# Patient Record
Sex: FEMALE | Race: BLACK OR AFRICAN AMERICAN | NOT HISPANIC OR LATINO | URBAN - METROPOLITAN AREA
[De-identification: names, ages, dates, MRNs, and addresses within clinical notes are randomized per-mention and may not be internally consistent; named-entity substitution may affect disease eponyms.]

---

## 2022-04-19 ENCOUNTER — EMERGENCY (EMERGENCY)
Facility: HOSPITAL | Age: 67
LOS: 1 days | Discharge: HOME | End: 2022-04-19
Admitting: EMERGENCY MEDICINE
Payer: MEDICAID

## 2022-04-19 VITALS
SYSTOLIC BLOOD PRESSURE: 143 MMHG | DIASTOLIC BLOOD PRESSURE: 71 MMHG | TEMPERATURE: 98 F | RESPIRATION RATE: 16 BRPM | HEART RATE: 78 BPM | OXYGEN SATURATION: 100 %

## 2022-04-19 PROCEDURE — 99282 EMERGENCY DEPT VISIT SF MDM: CPT

## 2022-04-19 NOTE — ED PROVIDER NOTE - PATIENT PORTAL LINK FT
You can access the FollowMyHealth Patient Portal offered by [facility] by registering at the following website: [email or web address]. By joining [employer]’s FollowMyHealth portal, you will also be able to view your health information using other applications (apps) compatible with our system.

## 2022-04-19 NOTE — ED ADULT TRIAGE NOTE - CHIEF COMPLAINT QUOTE
Pt c/o left side bottom mouth pain since January that's getting worse.  S/P extraction in October.  Left jaw inflammation.

## 2022-04-19 NOTE — ED PROVIDER NOTE - OBJECTIVE STATEMENT
66F with no PMH presenting with L upper jaw pain x 4 months. States she had L upper second molar extraction in October in a free clinic in [place] without complication. Pain began in January described throbbing ache in area of extraction. Has not gone back to the dentist to be evaluated or to primary care doctor who referred her. Has not taken anything for pain. Unable to eat on L side due to pain. Endorses L cheek pain. Denies seeing any drainage or blood. Denies fever, chills, CP, SOB, abd pain, N/V.

## 2022-04-19 NOTE — ED PROVIDER NOTE - CLINICAL SUMMARY MEDICAL DECISION MAKING FREE TEXT BOX
66 y.o female c/o L upper tooth pain x3 months, no swelling, no difficulty swallowing. no fever- rec to use nsaids and f/u in dental clinc.

## 2022-04-19 NOTE — ED PROVIDER NOTE - NSFOLLOWUPCLINICS_GEN_ALL_ED_FT
Oral & Maxillofacial Surgery  Department of Dental Medicine  [phone] [place] [address]  Phone: [phone]  Fax: [phone]

## 2022-04-19 NOTE — ED ADULT TRIAGE NOTE - DATE OF LAST VACCINATION
"LOC: The patient is awake, alert and aware of environment with an appropriate affect, the patient is oriented x 3 and speaking appropriately. Patient denies any changes in sensation. Patient with equal hand grasps bilaterally. Symmetrical facial expression present.   APPEARANCE: Patient resting comfortably and in no acute distress, patient is clean and well groomed, patient's clothing is properly fastened.  SKIN: The skin is warm and dry, patient has normal skin turgor and moist mucus membranes, skin intact, no breakdown or brusing noted.  MUSCULOSKELETAL: Patient moving all extremities well, no obvious swelling or deformities noted.  RESPIRATORY: Airway is open and patent, respirations are spontaneous, patient has a normal effort and rate. Breath sounds are clear and equal bilaterally. Ren cough.   CARDIAC: Normal heart sounds. No peripheral edema. Patient denies chest pain or SOA.  ABDOMEN: Soft and non tender to palpation, no distention noted. Bowel sounds present. Patient reports difficulty having BM. Patient denies N/V/D. Patient reports "achy" abdominal pain that feels like "gas".   " 19-Apr-2022

## 2022-11-07 NOTE — ED PROVIDER NOTE - CROS ED ROS STATEMENT
Addended by: Radha Spencer on: 11/7/2022 04:53 PM     Modules accepted: Level of Service
all other ROS negative except as per HPI

## 2025-04-28 ENCOUNTER — INPATIENT (INPATIENT)
Facility: HOSPITAL | Age: 70
LOS: 2 days | Discharge: HOME | End: 2025-05-01
Attending: HOSPITALIST | Admitting: HOSPITALIST
Payer: MEDICARE

## 2025-04-28 VITALS
TEMPERATURE: 97 F | RESPIRATION RATE: 20 BRPM | SYSTOLIC BLOOD PRESSURE: 122 MMHG | HEART RATE: 66 BPM | DIASTOLIC BLOOD PRESSURE: 74 MMHG | WEIGHT: 134.92 LBS | HEIGHT: 66 IN | OXYGEN SATURATION: 100 %

## 2025-04-28 DIAGNOSIS — R26.2 DIFFICULTY IN WALKING, NOT ELSEWHERE CLASSIFIED: ICD-10-CM

## 2025-04-28 DIAGNOSIS — R55 SYNCOPE AND COLLAPSE: ICD-10-CM

## 2025-04-28 DIAGNOSIS — M25.551 PAIN IN RIGHT HIP: ICD-10-CM

## 2025-04-28 PROBLEM — Z78.9 OTHER SPECIFIED HEALTH STATUS: Chronic | Status: ACTIVE | Noted: 2022-04-19

## 2025-04-28 LAB
ALBUMIN SERPL ELPH-MCNC: 3 G/DL — LOW (ref 3.3–5)
ALP SERPL-CCNC: 113 U/L — SIGNIFICANT CHANGE UP (ref 40–120)
ALT FLD-CCNC: 20 U/L — SIGNIFICANT CHANGE UP (ref 12–78)
ANION GAP SERPL CALC-SCNC: 2 MMOL/L — LOW (ref 5–17)
AST SERPL-CCNC: 17 U/L — SIGNIFICANT CHANGE UP (ref 15–37)
BASOPHILS # BLD AUTO: 0.03 K/UL — SIGNIFICANT CHANGE UP (ref 0–0.2)
BASOPHILS NFR BLD AUTO: 0.4 % — SIGNIFICANT CHANGE UP (ref 0–2)
BILIRUB SERPL-MCNC: 0.5 MG/DL — SIGNIFICANT CHANGE UP (ref 0.2–1.2)
BUN SERPL-MCNC: 17 MG/DL — SIGNIFICANT CHANGE UP (ref 7–23)
CALCIUM SERPL-MCNC: 8.9 MG/DL — SIGNIFICANT CHANGE UP (ref 8.5–10.1)
CHLORIDE SERPL-SCNC: 105 MMOL/L — SIGNIFICANT CHANGE UP (ref 96–108)
CO2 SERPL-SCNC: 30 MMOL/L — SIGNIFICANT CHANGE UP (ref 22–31)
CREAT SERPL-MCNC: 0.89 MG/DL — SIGNIFICANT CHANGE UP (ref 0.5–1.3)
CRP SERPL-MCNC: 25 MG/L — HIGH
EGFR: 70 ML/MIN/1.73M2 — SIGNIFICANT CHANGE UP
EGFR: 70 ML/MIN/1.73M2 — SIGNIFICANT CHANGE UP
EOSINOPHIL # BLD AUTO: 0.08 K/UL — SIGNIFICANT CHANGE UP (ref 0–0.5)
EOSINOPHIL NFR BLD AUTO: 1.1 % — SIGNIFICANT CHANGE UP (ref 0–6)
ERYTHROCYTE [SEDIMENTATION RATE] IN BLOOD: 23 MM/HR — HIGH (ref 0–20)
GLUCOSE SERPL-MCNC: 75 MG/DL — SIGNIFICANT CHANGE UP (ref 70–99)
HCT VFR BLD CALC: 35.3 % — SIGNIFICANT CHANGE UP (ref 34.5–45)
HGB BLD-MCNC: 11.2 G/DL — LOW (ref 11.5–15.5)
IMM GRANULOCYTES NFR BLD AUTO: 0.1 % — SIGNIFICANT CHANGE UP (ref 0–0.9)
LYMPHOCYTES # BLD AUTO: 1.39 K/UL — SIGNIFICANT CHANGE UP (ref 1–3.3)
LYMPHOCYTES # BLD AUTO: 19.5 % — SIGNIFICANT CHANGE UP (ref 13–44)
MAGNESIUM SERPL-MCNC: 2.2 MG/DL — SIGNIFICANT CHANGE UP (ref 1.6–2.6)
MCHC RBC-ENTMCNC: 27.8 PG — SIGNIFICANT CHANGE UP (ref 27–34)
MCHC RBC-ENTMCNC: 31.7 G/DL — LOW (ref 32–36)
MCV RBC AUTO: 87.6 FL — SIGNIFICANT CHANGE UP (ref 80–100)
MONOCYTES # BLD AUTO: 0.78 K/UL — SIGNIFICANT CHANGE UP (ref 0–0.9)
MONOCYTES NFR BLD AUTO: 10.9 % — SIGNIFICANT CHANGE UP (ref 2–14)
NEUTROPHILS # BLD AUTO: 4.84 K/UL — SIGNIFICANT CHANGE UP (ref 1.8–7.4)
NEUTROPHILS NFR BLD AUTO: 68 % — SIGNIFICANT CHANGE UP (ref 43–77)
NRBC BLD AUTO-RTO: 0 /100 WBCS — SIGNIFICANT CHANGE UP (ref 0–0)
PLATELET # BLD AUTO: 214 K/UL — SIGNIFICANT CHANGE UP (ref 150–400)
POTASSIUM SERPL-MCNC: 4.2 MMOL/L — SIGNIFICANT CHANGE UP (ref 3.5–5.3)
POTASSIUM SERPL-SCNC: 4.2 MMOL/L — SIGNIFICANT CHANGE UP (ref 3.5–5.3)
PROT SERPL-MCNC: 6.7 GM/DL — SIGNIFICANT CHANGE UP (ref 6–8.3)
RBC # BLD: 4.03 M/UL — SIGNIFICANT CHANGE UP (ref 3.8–5.2)
RBC # FLD: 14 % — SIGNIFICANT CHANGE UP (ref 10.3–14.5)
SODIUM SERPL-SCNC: 137 MMOL/L — SIGNIFICANT CHANGE UP (ref 135–145)
TROPONIN I, HIGH SENSITIVITY RESULT: <3 NG/L — SIGNIFICANT CHANGE UP
TSH SERPL-MCNC: 1.09 UU/ML — SIGNIFICANT CHANGE UP (ref 0.36–3.74)
WBC # BLD: 7.13 K/UL — SIGNIFICANT CHANGE UP (ref 3.8–10.5)
WBC # FLD AUTO: 7.13 K/UL — SIGNIFICANT CHANGE UP (ref 3.8–10.5)

## 2025-04-28 PROCEDURE — 71045 X-RAY EXAM CHEST 1 VIEW: CPT | Mod: 26

## 2025-04-28 PROCEDURE — 99285 EMERGENCY DEPT VISIT HI MDM: CPT

## 2025-04-28 PROCEDURE — 70450 CT HEAD/BRAIN W/O DYE: CPT | Mod: 26

## 2025-04-28 PROCEDURE — 93010 ELECTROCARDIOGRAM REPORT: CPT

## 2025-04-28 PROCEDURE — 99222 1ST HOSP IP/OBS MODERATE 55: CPT

## 2025-04-28 PROCEDURE — 73502 X-RAY EXAM HIP UNI 2-3 VIEWS: CPT | Mod: 26,RT

## 2025-04-28 RX ORDER — TIMOLOL MALEATE 6.8 MG/ML
1 SOLUTION OPHTHALMIC
Refills: 0 | Status: DISCONTINUED | OUTPATIENT
Start: 2025-04-28 | End: 2025-05-01

## 2025-04-28 RX ORDER — BRIMONIDINE TARTRATE, TIMOLOL MALEATE 2; 5 MG/ML; MG/ML
1 SOLUTION/ DROPS TOPICAL
Refills: 0 | DISCHARGE

## 2025-04-28 RX ORDER — MELATONIN 5 MG
3 TABLET ORAL AT BEDTIME
Refills: 0 | Status: DISCONTINUED | OUTPATIENT
Start: 2025-04-28 | End: 2025-05-01

## 2025-04-28 RX ORDER — INFLUENZA A VIRUS A/IDAHO/07/2018 (H1N1) ANTIGEN (MDCK CELL DERIVED, PROPIOLACTONE INACTIVATED, INFLUENZA A VIRUS A/INDIANA/08/2018 (H3N2) ANTIGEN (MDCK CELL DERIVED, PROPIOLACTONE INACTIVATED), INFLUENZA B VIRUS B/SINGAPORE/INFTT-16-0610/2016 ANTIGEN (MDCK CELL DERIVED, PROPIOLACTONE INACTIVATED), INFLUENZA B VIRUS B/IOWA/06/2017 ANTIGEN (MDCK CELL DERIVED, PROPIOLACTONE INACTIVATED) 15; 15; 15; 15 UG/.5ML; UG/.5ML; UG/.5ML; UG/.5ML
0.5 INJECTION, SUSPENSION INTRAMUSCULAR ONCE
Refills: 0 | Status: DISCONTINUED | OUTPATIENT
Start: 2025-04-28 | End: 2025-05-01

## 2025-04-28 RX ORDER — LATANOPROST PF 0.05 MG/ML
1 SOLUTION/ DROPS OPHTHALMIC
Refills: 0 | DISCHARGE

## 2025-04-28 RX ORDER — LATANOPROST PF 0.05 MG/ML
1 SOLUTION/ DROPS OPHTHALMIC AT BEDTIME
Refills: 0 | Status: DISCONTINUED | OUTPATIENT
Start: 2025-04-28 | End: 2025-05-01

## 2025-04-28 RX ORDER — ACETAMINOPHEN 500 MG/5ML
1000 LIQUID (ML) ORAL ONCE
Refills: 0 | Status: COMPLETED | OUTPATIENT
Start: 2025-04-28 | End: 2025-04-28

## 2025-04-28 RX ORDER — ACETAMINOPHEN 500 MG/5ML
650 LIQUID (ML) ORAL EVERY 6 HOURS
Refills: 0 | Status: DISCONTINUED | OUTPATIENT
Start: 2025-04-28 | End: 2025-05-01

## 2025-04-28 RX ORDER — BRIMONIDINE TARTRATE 1.5 MG/ML
1 SOLUTION/ DROPS OPHTHALMIC
Refills: 0 | Status: DISCONTINUED | OUTPATIENT
Start: 2025-04-28 | End: 2025-05-01

## 2025-04-28 RX ORDER — SODIUM CHLORIDE 9 G/1000ML
1000 INJECTION, SOLUTION INTRAVENOUS
Refills: 0 | Status: DISCONTINUED | OUTPATIENT
Start: 2025-04-28 | End: 2025-05-01

## 2025-04-28 RX ADMIN — Medication 400 MILLIGRAM(S): at 12:58

## 2025-04-28 RX ADMIN — SODIUM CHLORIDE 125 MILLILITER(S): 9 INJECTION, SOLUTION INTRAVENOUS at 18:55

## 2025-04-28 RX ADMIN — Medication 1000 MILLILITER(S): at 12:58

## 2025-04-28 RX ADMIN — LATANOPROST PF 1 DROP(S): 0.05 SOLUTION/ DROPS OPHTHALMIC at 21:09

## 2025-04-28 NOTE — PATIENT PROFILE ADULT - NSPROMEDSADMININFO_GEN_A_NUR
Statement Selected Statement Selected Statement Selected Statement Selected Statement Selected Statement Selected Statement Selected no concerns

## 2025-04-28 NOTE — PHYSICAL THERAPY INITIAL EVALUATION ADULT - GAIT TRAINING, PT EVAL
To be able to perform ambulation independently using cane for 200 feet, using proper technique using AD, with proper posture and functional distance at home in 2-3 weeks.

## 2025-04-28 NOTE — PHYSICAL THERAPY INITIAL EVALUATION ADULT - PERTINENT HX OF CURRENT PROBLEM, REHAB EVAL
This is the hx of ZAIN. a 70 y/o female patient who was admitted to [facility] due to complications of Syncope, pain on R hip affecting medical condition and with subsequent affection on functional mobility.

## 2025-04-28 NOTE — H&P ADULT - HISTORY OF PRESENT ILLNESS
69 years old female with no significant past medical history present to ED with syncope. Patient has h/o chronic hip pain. Her left hip pain was worsened than baseline today AM. While standing, patient felt dizzy then she sat down on her bed. Reported worsening of dizziness associated with blurry vision and passed out for around 30 seconds. No chest pain, SOB or diaphoresis. No nausea, vomiting or diarrhea.   Hemodynamically stable, afebrile, sat well at RA. EKG with NSR, nonspecific ST changes. No leukocytosis, plt 214, Cr 0.89, TSH 1.090, hsTnT negative. CXR with no focal consolidation. Xray hip with mild OA. CT head with no acute pathology.

## 2025-04-28 NOTE — PHYSICAL THERAPY INITIAL EVALUATION ADULT - ADDITIONAL COMMENTS
as per patient, she lives with sister with 4 stairs to enter with no HR's, inside 12 stairs with no rails to access basement. pt was independent.

## 2025-04-28 NOTE — ED ADULT NURSE NOTE - NSFALLRISKINTERV_ED_ALL_ED

## 2025-04-28 NOTE — H&P ADULT - NSHPPHYSICALEXAM_GEN_ALL_CORE
CONSTITUTIONAL: alert and cooperative, no acute distress  EYES: PERRL, no scleral icterus  ENT: Mucosa moist, tongue normal  NECK: Neck supple, trachea midline, non-tender  CARDIAC: Normal S1 and S2. Regular rate and rhythms. No Pedal edema  LUNGS: Equal air entry both lungs. No rales, rhonchi, wheezing. Normal respiratory effort.   ABDOMEN: Soft, nondistended, nontender. No guarding or rebound tenderness. No hepatomegaly or splenomegaly. Bowel sound normal  MUSCULOSKELETAL: Normocephalic, atraumatic. No significant deformity or joint abnormality. Slight right hip pain with movement  NEUROLOGICAL: No gross motor or sensory deficits  SKIN: no lesions or eruptions. Normal turgor  PSYCHIATRIC: A&O x 3, appropriate mood and affect

## 2025-04-28 NOTE — H&P ADULT - PROBLEM SELECTOR PLAN 1
syncope at home  CT head  ( I personally review) no acute pathology  EKG  ( I personally review) NSR, non specific ST changes  hsTnT negative  presentation suggestive of vasovagal syncope due to pain  Check orthostatic vitals  Trial of IV fluid  Telemetry monitoring  Outpatient cardiology follow up

## 2025-04-28 NOTE — PHYSICAL THERAPY INITIAL EVALUATION ADULT - GENERAL OBSERVATIONS, REHAB EVAL
Chart reviewed, pt encountered in supine, AxOx4, cooperative, c/o pain on R hip. sister Maribel at bedside.

## 2025-04-28 NOTE — ED PROVIDER NOTE - CLINICAL SUMMARY MEDICAL DECISION MAKING FREE TEXT BOX
Patient with multiple issues today - syncope with concern for cardiac etiology given her history of frequent PVCs, and worsening right hip atraumatic pain.  VSS.  NIHSS 0.  EKG NSR rate 63, no acute st e/d, no twi, qtc 403.  Will obtain labs, CT imaging, xrays of hip, likely admit for cardiac obs, concern for possible runs of vtach. Patient with multiple issues today - syncope with concern for cardiac etiology given her history of frequent PVCs (prehospital rhythm strip reviewed), and worsening right hip atraumatic pain.  VSS.  NIHSS 0.  EKG NSR rate 63, no acute st e/d, no twi, qtc 403.  Will obtain labs, CT imaging, xrays of hip, likely admit for cardiac obs, concern for possible runs of vtach.    Admit note: Labs, CT, CXR findings reviewed. Patient is to be admitted to the hospital and the case was discussed with the admitting physician.  Any changes in plan, additional imaging/labs, consults, and further work up will be at the discretion of the admitting physician.

## 2025-04-28 NOTE — ED ADULT NURSE NOTE - BOWEL SOUNDS LLQ
Per Provider FR patient was told and encourage to continue with the Macrobid and take it with food every 12 hours  present

## 2025-04-28 NOTE — ED PROVIDER NOTE - PHYSICAL EXAMINATION
Gen: Alert, mild distress, well appearing  Head: NC, AT, PERRL, EOMI, normal lids/conjunctiva  ENT: normal hearing, patent oropharynx without erythema/exudate, uvula midline  Neck: +supple, no tenderness/meningismus/JVD, +Trachea midline  Pulm: Bilateral BS, normal resp effort, no wheeze/stridor/retractions  CV: RRR, no M/R/G, +dist pulses  Abd: soft, NT/ND, Negative Jber signs, +BS, no palpable masses  Mskel: no edema/erythema/cyanosis, +right hip ttp, no swelling  Skin: no rash, warm/dry  Neuro: AAOx3, no apparent sensory/motor deficits, coordination intact

## 2025-04-28 NOTE — PATIENT PROFILE ADULT - FALL HARM RISK - HARM RISK INTERVENTIONS
Assistance with ambulation/Assistance OOB with selected safe patient handling equipment/Communicate Risk of Fall with Harm to all staff/Discuss with provider need for PT consult/Monitor gait and stability/Reinforce activity limits and safety measures with patient and family/Tailored Fall Risk Interventions/Visual Cue: Yellow wristband and red socks/Bed in lowest position, wheels locked, appropriate side rails in place/Call bell, personal items and telephone in reach/Instruct patient to call for assistance before getting out of bed or chair/Non-slip footwear when patient is out of bed/[place] to call system/Physically safe environment - no spills, clutter or unnecessary equipment/Purposeful Proactive Rounding/Room/bathroom lighting operational, light cord in reach

## 2025-04-28 NOTE — ED ADULT NURSE NOTE - OBJECTIVE STATEMENT
Patient received A&O x4 breathing unlabored on room air, complaining of right buttock pain that radiates to tight knee, patient voiced pain was execrating before she passed out but still feels dizzy. Stat EKG completed, patient sinus rhythm on cardiac monitor.

## 2025-04-28 NOTE — PATIENT PROFILE ADULT - NSPROPTRIGHTSUPPORTPHONE_GEN_A_NUR
Detail Level: Simple Total Volume (Ccs): 0.2 X Size Of Lesion In Cm (Optional): 0 Administered By (Optional): Gianna Liao PA-C Expiration Date For Kenalog (Optional): NOV 2024 Consent: The risks of atrophy were reviewed with the patient. Concentration Of Kenalog Solution Injected (Mg/Ml): 20.0 Include Z78.9 (Other Specified Conditions Influencing Health Status) As An Associated Diagnosis?: No Validate Note Data When Using Inventory: Yes Which Kenalog Vial Was Used?: Kenalog 40 mg/ml (5 ml vial) Kenalog Type Of Vial: Multiple Dose Medical Necessity Clause: This procedure was medically necessary because the lesions that were treated were: Kenalog Preparation: Kenalog Lot # For Kenalog (Optional): [id number]

## 2025-04-28 NOTE — ED PROVIDER NOTE - OBJECTIVE STATEMENT
Pertinent PMH/PSH/FHx/SHx and Review of Systems contained within:  Patient presents to the ED for multiple complaints.  She is primarily in ER because she had a witnessed syncopal event lasting minutes at home.  Patient was laying in bed, had become unresponsive with brother as witness.  Says that she recalled becoming very lightheaded, dizzy, and blacked out.  She denies headache, vision changes, chest pain, dyspnea, palpitations, numbness/tingling.  When EMS arrived, patient was noted to have frequent PVCs per triage report.  Patient was told in the past that she needed to see a cardiologist for her "extra heart beats" but has not done so.  She is NSR in ER.  She also complains of worsening right hip pain x1 week making her ambulation difficult, denies fall or injury, no back pain.     Relevant PMHx/SHx/SOCHx/FAMH:  Left hip pain, no other relevant history  Patient denies EtOH/tobacco/illicit substance use.    ROS: No fever/chills, No headache/photophobia/eye pain/changes in vision, No ear pain/sore throat/dysphagia, No chest pain/palpitations, no SOB/cough/wheeze/stridor, No abdominal pain, No N/V/D/melena, no dysuria/frequency/discharge, No neck/back pain, no rash, no changes in neurological status/function.

## 2025-04-29 LAB
ALBUMIN SERPL ELPH-MCNC: 2.9 G/DL — LOW (ref 3.3–5)
ALP SERPL-CCNC: 108 U/L — SIGNIFICANT CHANGE UP (ref 40–120)
ALT FLD-CCNC: 27 U/L — SIGNIFICANT CHANGE UP (ref 12–78)
ANION GAP SERPL CALC-SCNC: 6 MMOL/L — SIGNIFICANT CHANGE UP (ref 5–17)
AST SERPL-CCNC: 24 U/L — SIGNIFICANT CHANGE UP (ref 15–37)
BILIRUB SERPL-MCNC: 0.4 MG/DL — SIGNIFICANT CHANGE UP (ref 0.2–1.2)
BUN SERPL-MCNC: 13 MG/DL — SIGNIFICANT CHANGE UP (ref 7–23)
CALCIUM SERPL-MCNC: 8.7 MG/DL — SIGNIFICANT CHANGE UP (ref 8.5–10.1)
CHLORIDE SERPL-SCNC: 111 MMOL/L — HIGH (ref 96–108)
CO2 SERPL-SCNC: 27 MMOL/L — SIGNIFICANT CHANGE UP (ref 22–31)
CREAT SERPL-MCNC: 0.74 MG/DL — SIGNIFICANT CHANGE UP (ref 0.5–1.3)
EGFR: 88 ML/MIN/1.73M2 — SIGNIFICANT CHANGE UP
EGFR: 88 ML/MIN/1.73M2 — SIGNIFICANT CHANGE UP
GLUCOSE SERPL-MCNC: 87 MG/DL — SIGNIFICANT CHANGE UP (ref 70–99)
HCT VFR BLD CALC: 33.9 % — LOW (ref 34.5–45)
HGB BLD-MCNC: 10.7 G/DL — LOW (ref 11.5–15.5)
MAGNESIUM SERPL-MCNC: 2.1 MG/DL — SIGNIFICANT CHANGE UP (ref 1.6–2.6)
MCHC RBC-ENTMCNC: 27.5 PG — SIGNIFICANT CHANGE UP (ref 27–34)
MCHC RBC-ENTMCNC: 31.6 G/DL — LOW (ref 32–36)
MCV RBC AUTO: 87.1 FL — SIGNIFICANT CHANGE UP (ref 80–100)
NRBC BLD AUTO-RTO: 0 /100 WBCS — SIGNIFICANT CHANGE UP (ref 0–0)
PHOSPHATE SERPL-MCNC: 3.5 MG/DL — SIGNIFICANT CHANGE UP (ref 2.5–4.5)
PLATELET # BLD AUTO: 232 K/UL — SIGNIFICANT CHANGE UP (ref 150–400)
POTASSIUM SERPL-MCNC: 3.8 MMOL/L — SIGNIFICANT CHANGE UP (ref 3.5–5.3)
POTASSIUM SERPL-SCNC: 3.8 MMOL/L — SIGNIFICANT CHANGE UP (ref 3.5–5.3)
PROT SERPL-MCNC: 6.5 GM/DL — SIGNIFICANT CHANGE UP (ref 6–8.3)
RBC # BLD: 3.89 M/UL — SIGNIFICANT CHANGE UP (ref 3.8–5.2)
RBC # FLD: 14.1 % — SIGNIFICANT CHANGE UP (ref 10.3–14.5)
SODIUM SERPL-SCNC: 144 MMOL/L — SIGNIFICANT CHANGE UP (ref 135–145)
WBC # BLD: 6.11 K/UL — SIGNIFICANT CHANGE UP (ref 3.8–10.5)
WBC # FLD AUTO: 6.11 K/UL — SIGNIFICANT CHANGE UP (ref 3.8–10.5)

## 2025-04-29 PROCEDURE — 99232 SBSQ HOSP IP/OBS MODERATE 35: CPT

## 2025-04-29 RX ORDER — ENOXAPARIN SODIUM 100 MG/ML
40 INJECTION SUBCUTANEOUS EVERY 24 HOURS
Refills: 0 | Status: DISCONTINUED | OUTPATIENT
Start: 2025-04-29 | End: 2025-05-01

## 2025-04-29 RX ORDER — IBUPROFEN 200 MG
400 TABLET ORAL THREE TIMES A DAY
Refills: 0 | Status: DISCONTINUED | OUTPATIENT
Start: 2025-04-29 | End: 2025-05-01

## 2025-04-29 RX ADMIN — BRIMONIDINE TARTRATE 1 DROP(S): 1.5 SOLUTION/ DROPS OPHTHALMIC at 17:54

## 2025-04-29 RX ADMIN — TIMOLOL MALEATE 1 DROP(S): 6.8 SOLUTION OPHTHALMIC at 17:53

## 2025-04-29 RX ADMIN — SODIUM CHLORIDE 125 MILLILITER(S): 9 INJECTION, SOLUTION INTRAVENOUS at 05:53

## 2025-04-29 RX ADMIN — Medication 400 MILLIGRAM(S): at 21:22

## 2025-04-29 RX ADMIN — Medication 400 MILLIGRAM(S): at 15:25

## 2025-04-29 RX ADMIN — Medication 650 MILLIGRAM(S): at 06:01

## 2025-04-29 RX ADMIN — LATANOPROST PF 1 DROP(S): 0.05 SOLUTION/ DROPS OPHTHALMIC at 21:23

## 2025-04-29 RX ADMIN — Medication 400 MILLIGRAM(S): at 14:24

## 2025-04-29 RX ADMIN — Medication 650 MILLIGRAM(S): at 04:56

## 2025-04-29 RX ADMIN — Medication 400 MILLIGRAM(S): at 22:22

## 2025-04-29 RX ADMIN — SODIUM CHLORIDE 125 MILLILITER(S): 9 INJECTION, SOLUTION INTRAVENOUS at 03:13

## 2025-04-29 RX ADMIN — ENOXAPARIN SODIUM 40 MILLIGRAM(S): 100 INJECTION SUBCUTANEOUS at 18:06

## 2025-04-29 NOTE — PROGRESS NOTE ADULT - ASSESSMENT
69 years old female with no significant past medical history present to ED with syncope. Patient has h/o chronic hip pain. Her left hip pain was worsened than baseline today AM. While standing, patient felt dizzy then she sat down on her bed. Reported worsening of dizziness associated with blurry vision and passed out for around 30 seconds. No chest pain, SOB or diaphoresis. No nausea, vomiting or diarrhea.   Hemodynamically stable, afebrile, sat well at RA. EKG with NSR, nonspecific ST changes. No leukocytosis, plt 214, Cr 0.89, TSH 1.090, hsTnT negative. CXR with no focal consolidation. Xray hip with mild OA. CT head with no acute pathology.     < from: Xray Hip 2-3 Views, Right (04.28.25 @ 11:10) >  Mild OA with no radiographic evidence of calcific tendinosis. If there is continued clinical concern follow-up MRI can be ordered.  Negative chest  < end of copied text >    # R hip pain - with mild OA.  Still with significant pain.  Will check CT hip to r/o fracture.  Trial of Motrin.  # Syncope - CT head no acute pathology, Some PVCs on Tele.  Outpatient Cardiology followup.  # Impaired ambulation.  ·  Plan: due to right hip pain/OA Xray hip with mild OA PT consult.  # Inpatient DVT Prophylaxis - Lovenox subcut         69 years old female with no significant past medical history present to ED with syncope. Patient has h/o chronic hip pain. Her left hip pain was worsened than baseline today AM. While standing, patient felt dizzy then she sat down on her bed. Reported worsening of dizziness associated with blurry vision and passed out for around 30 seconds. No chest pain, SOB or diaphoresis. No nausea, vomiting or diarrhea.   Hemodynamically stable, afebrile, sat well at RA. EKG with NSR, nonspecific ST changes. No leukocytosis, plt 214, Cr 0.89, TSH 1.090, hsTnT negative. CXR with no focal consolidation. Xray hip with mild OA. CT head with no acute pathology.     < from: Xray Hip 2-3 Views, Right (04.28.25 @ 11:10) >  Mild OA with no radiographic evidence of calcific tendinosis. If there is continued clinical concern follow-up MRI can be ordered.  Negative chest  < end of copied text >    # R hip pain - with mild OA.  Still with significant pain.  Will check CT hip to r/o fracture.  Trial of Motrin.  # Syncope - CT head no acute pathology, Some PVCs on Tele.  Outpatient Cardiology followup.  # Impaired ambulation.  ·  Plan: due to right hip pain/OA Xray hip with mild OA PT consult.  # Inpatient DVT Prophylaxis - Lovenox subcut    Anticipate DIDI pending facility and insurance auth.

## 2025-04-29 NOTE — PROGRESS NOTE ADULT - SUBJECTIVE AND OBJECTIVE BOX
Patient: GUILLERMO PATRICK [id number] 69y Female                            Hospitalist Attending Note    C/o R hip pain, chronic, over the past week.  No trauma / fall.    Pain mild, controlled.    No weakness / numbness.    ____________________PHYSICAL EXAM:  GENERAL:  NAD Alert and Oriented x 3   HEENT: NCAT  CARDIOVASCULAR:  S1, S2  LUNGS: CTAB  ABDOMEN:  soft, (-) tenderness, (-) distension, (+) bowel sounds, (-) guarding, (-) rebound (-) rigidity  EXTREMITIES:  no cyanosis / clubbing / edema.   ____________________         VITALS:  Vital Signs Last 24 Hrs  T(C): 37.1 (2025 11:00), Max: 37.2 (2025 18:06)  T(F): 98.8 (2025 11:00), Max: 99 (2025 18:06)  HR: 61 (:00) (61 - 89)  BP: 116/76 (2025 11:00) (116/76 - 151/83)  BP(mean): --  RR: 18 (2025 11:00) (16 - 18)  SpO2: 99% (2025 11:00) (98% - 100%)    Parameters below as of 2025 11:00  Patient On (Oxygen Delivery Method): nasal cannula     Daily     Daily Weight in k.2 (2025 04:28)  CAPILLARY BLOOD GLUCOSE        I&O's Summary    2025 07:  -  2025 07:00  --------------------------------------------------------  IN: 2240 mL / OUT: 0 mL / NET: 2240 mL    2025 07:01  -  2025 15:47  --------------------------------------------------------  IN: 780 mL / OUT: 0 mL / NET: 780 mL        HISTORY:  PAST MEDICAL & SURGICAL HISTORY:  No pertinent past medical history      Allergies    No Known Drug Allergies  shellfish (Unknown)    Intolerances       LABS:                        10.7   6.11  )-----------( 232      ( 2025 07:20 )             33.9     -    144  |  111[H]  |  13  ----------------------------<  87  3.8   |  27  |  0.74    Ca    8.7      2025 07:20  Phos  3.5       Mg     2.1         TPro  6.5  /  Alb  2.9[L]  /  TBili  0.4  /  DBili  x   /  AST  24  /  ALT  27  /  AlkPhos  108        LIVER FUNCTIONS - ( 2025 07:20 )  Alb: 2.9 g/dL / Pro: 6.5 gm/dL / ALK PHOS: 108 U/L / ALT: 27 U/L / AST: 24 U/L / GGT: x           Urinalysis Basic - ( 2025 07:20 )    Color: x / Appearance: x / SG: x / pH: x  Gluc: 87 mg/dL / Ketone: x  / Bili: x / Urobili: x   Blood: x / Protein: x / Nitrite: x   Leuk Esterase: x / RBC: x / WBC x   Sq Epi: x / Non Sq Epi: x / Bacteria: x              MEDICATIONS:  MEDICATIONS  (STANDING):  brimonidine 0.2% Ophthalmic Solution 1 Drop(s) Both EYES two times a day  ibuprofen  Tablet. 400 milliGRAM(s) Oral three times a day  influenza  Vaccine (HIGH DOSE) 0.5 milliLiter(s) IntraMuscular once  lactated ringers. 1000 milliLiter(s) (125 mL/Hr) IV Continuous <Continuous>  latanoprost 0.005% Ophthalmic Solution 1 Drop(s) Both EYES at bedtime  timolol 0.5% Solution 1 Drop(s) Both EYES two times a day    MEDICATIONS  (PRN):  acetaminophen     Tablet .. 650 milliGRAM(s) Oral every 6 hours PRN Mild Pain (1 - 3), Moderate Pain (4 - 6)  melatonin 3 milliGRAM(s) Oral at bedtime PRN Insomnia

## 2025-04-29 NOTE — PHARMACOTHERAPY INTERVENTION NOTE - COMMENTS
Counseled patient on glaucoma medications while inpatient as patient was refusing inpatient therapy and requesting to take home medication, Combigan.    Educated patient on the equivalence of Combigan to brimonidine and timolol. I further explained to the patient that while inpatient she can take both the brimonidine and timolol rather than the Combigan.    After counseling, patient was agreeable to take the two separate eye drops instead of Combigan while inpatient.   Counseled patient on glaucoma medications while inpatient as patient was requesting to take home medication, Combigan, instead of inpatient therapy.    Educated patient on the equivalence of Combigan to brimonidine and timolol. I further explained to the patient that while inpatient she can take both the brimonidine and timolol rather than the Combigan.    After counseling, patient was agreeable to take the two separate eye drops instead of Combigan while inpatient.

## 2025-04-30 PROCEDURE — 73700 CT LOWER EXTREMITY W/O DYE: CPT | Mod: 26,RT

## 2025-04-30 PROCEDURE — 99232 SBSQ HOSP IP/OBS MODERATE 35: CPT

## 2025-04-30 PROCEDURE — 72170 X-RAY EXAM OF PELVIS: CPT | Mod: 26

## 2025-04-30 PROCEDURE — 73552 X-RAY EXAM OF FEMUR 2/>: CPT | Mod: 26,RT

## 2025-04-30 RX ADMIN — LATANOPROST PF 1 DROP(S): 0.05 SOLUTION/ DROPS OPHTHALMIC at 21:17

## 2025-04-30 RX ADMIN — BRIMONIDINE TARTRATE 1 DROP(S): 1.5 SOLUTION/ DROPS OPHTHALMIC at 20:17

## 2025-04-30 RX ADMIN — Medication 400 MILLIGRAM(S): at 22:17

## 2025-04-30 RX ADMIN — Medication 400 MILLIGRAM(S): at 06:33

## 2025-04-30 RX ADMIN — Medication 400 MILLIGRAM(S): at 13:08

## 2025-04-30 RX ADMIN — Medication 400 MILLIGRAM(S): at 14:00

## 2025-04-30 RX ADMIN — TIMOLOL MALEATE 1 DROP(S): 6.8 SOLUTION OPHTHALMIC at 05:34

## 2025-04-30 RX ADMIN — BRIMONIDINE TARTRATE 1 DROP(S): 1.5 SOLUTION/ DROPS OPHTHALMIC at 05:34

## 2025-04-30 RX ADMIN — Medication 400 MILLIGRAM(S): at 05:33

## 2025-04-30 RX ADMIN — TIMOLOL MALEATE 1 DROP(S): 6.8 SOLUTION OPHTHALMIC at 20:18

## 2025-04-30 RX ADMIN — Medication 400 MILLIGRAM(S): at 21:17

## 2025-04-30 RX ADMIN — ENOXAPARIN SODIUM 40 MILLIGRAM(S): 100 INJECTION SUBCUTANEOUS at 18:00

## 2025-04-30 NOTE — PROGRESS NOTE ADULT - SUBJECTIVE AND OBJECTIVE BOX
Patient is a 69y old  Female who presents with a chief complaint of syncope (29 Apr 2025 15:47)      INTERVAL HPI/OVERNIGHT EVENTS:  afebrile vitals stable overnight   seen and examined at bedside     MEDICATIONS  (STANDING):  brimonidine 0.2% Ophthalmic Solution 1 Drop(s) Both EYES two times a day  enoxaparin Injectable 40 milliGRAM(s) SubCutaneous every 24 hours  ibuprofen  Tablet. 400 milliGRAM(s) Oral three times a day  influenza  Vaccine (HIGH DOSE) 0.5 milliLiter(s) IntraMuscular once  lactated ringers. 1000 milliLiter(s) (125 mL/Hr) IV Continuous <Continuous>  latanoprost 0.005% Ophthalmic Solution 1 Drop(s) Both EYES at bedtime  timolol 0.5% Solution 1 Drop(s) Both EYES two times a day    MEDICATIONS  (PRN):  acetaminophen     Tablet .. 650 milliGRAM(s) Oral every 6 hours PRN Mild Pain (1 - 3), Moderate Pain (4 - 6)  melatonin 3 milliGRAM(s) Oral at bedtime PRN Insomnia      Allergies    No Known Drug Allergies  shellfish (Unknown)    Intolerances        REVIEW OF SYSTEMS:  CONSTITUTIONAL: No fever, weight loss, or fatigue  EYES: No eye pain, visual disturbances, or discharge  ENMT:  No difficulty hearing, tinnitus, vertigo; No sinus or throat pain  NECK: No pain or stiffness  BREASTS: No pain, masses, or nipple discharge  RESPIRATORY: No cough, wheezing, chills or hemoptysis; No shortness of breath  CARDIOVASCULAR: No chest pain, palpitations, dizziness, or leg swelling  GASTROINTESTINAL: No abdominal or epigastric pain. No nausea, vomiting, or hematemesis; No diarrhea or constipation. No melena or hematochezia.  GENITOURINARY: No dysuria, frequency, hematuria, or incontinence  NEUROLOGICAL: No headaches, memory loss, loss of strength, numbness, or tremors  SKIN: No itching, burning, rashes, or lesions   LYMPH NODES: No enlarged glands  ENDOCRINE: No heat or cold intolerance; No hair loss  MUSCULOSKELETAL: No joint pain or swelling; No muscle, back, or extremity pain  PSYCHIATRIC: No depression, anxiety, mood swings, or difficulty sleeping  HEME/LYMPH: No easy bruising, or bleeding gums  ALLERGY AND IMMUNOLOGIC: No hives or eczema    Vital Signs Last 24 Hrs  T(C): 36.7 (30 Apr 2025 04:44), Max: 37.4 (29 Apr 2025 16:05)  T(F): 98.1 (30 Apr 2025 04:44), Max: 99.3 (29 Apr 2025 16:05)  HR: 75 (30 Apr 2025 04:44) (61 - 80)  BP: 138/78 (30 Apr 2025 04:44) (112/60 - 138/78)  BP(mean): --  RR: 18 (30 Apr 2025 04:44) (18 - 18)  SpO2: 98% (30 Apr 2025 04:44) (98% - 100%)    Parameters below as of 29 Apr 2025 11:00  Patient On (Oxygen Delivery Method): nasal cannula        PHYSICAL EXAM:  GENERAL: NAD, well-groomed, well-developed  HEAD:  Atraumatic, Normocephalic  EYES: EOMI, PERRLA, conjunctiva and sclera clear  ENMT: No tonsillar erythema, exudates, or enlargement; Moist mucous membranes, Good dentition, No lesions  NECK: Supple, No JVD, Normal thyroid  NERVOUS SYSTEM:  Alert & Oriented X3, Good concentration; Motor Strength 5/5 B/L upper and lower extremities; DTRs 2+ intact and symmetric  CHEST/LUNG: Clear to percussion bilaterally; No rales, rhonchi, wheezing, or rubs  HEART: Regular rate and rhythm; No murmurs, rubs, or gallops  ABDOMEN: Soft, Nontender, Nondistended; Bowel sounds present  EXTREMITIES:  2+ Peripheral Pulses, No clubbing, cyanosis, or edema  LYMPH: No lymphadenopathy noted  SKIN: No rashes or lesions    LABS:                        10.7   6.11  )-----------( 232      ( 29 Apr 2025 07:20 )             33.9     04-29    144  |  111[H]  |  13  ----------------------------<  87  3.8   |  27  |  0.74    Ca    8.7      29 Apr 2025 07:20  Phos  3.5     04-29  Mg     2.1     04-29    TPro  6.5  /  Alb  2.9[L]  /  TBili  0.4  /  DBili  x   /  AST  24  /  ALT  27  /  AlkPhos  108  04-29      Urinalysis Basic - ( 29 Apr 2025 07:20 )    Color: x / Appearance: x / SG: x / pH: x  Gluc: 87 mg/dL / Ketone: x  / Bili: x / Urobili: x   Blood: x / Protein: x / Nitrite: x   Leuk Esterase: x / RBC: x / WBC x   Sq Epi: x / Non Sq Epi: x / Bacteria: x      CAPILLARY BLOOD GLUCOSE          RADIOLOGY & ADDITIONAL TESTS:    Imaging Personally Reviewed:  [ X] YES  [ ] NO    Consultant(s) Notes Reviewed:  [ X] YES  [ ] NO    Care Discussed with Consultants/Other Providers [X ] YES  [ ] NO Patient is a 69y old  Female who presents with a chief complaint of syncope (29 Apr 2025 15:47)      INTERVAL HPI/OVERNIGHT EVENTS:  afebrile vitals stable overnight   seen and examined at bedside   endorsing R hip pain    MEDICATIONS  (STANDING):  brimonidine 0.2% Ophthalmic Solution 1 Drop(s) Both EYES two times a day  enoxaparin Injectable 40 milliGRAM(s) SubCutaneous every 24 hours  ibuprofen  Tablet. 400 milliGRAM(s) Oral three times a day  influenza  Vaccine (HIGH DOSE) 0.5 milliLiter(s) IntraMuscular once  lactated ringers. 1000 milliLiter(s) (125 mL/Hr) IV Continuous <Continuous>  latanoprost 0.005% Ophthalmic Solution 1 Drop(s) Both EYES at bedtime  timolol 0.5% Solution 1 Drop(s) Both EYES two times a day    MEDICATIONS  (PRN):  acetaminophen     Tablet .. 650 milliGRAM(s) Oral every 6 hours PRN Mild Pain (1 - 3), Moderate Pain (4 - 6)  melatonin 3 milliGRAM(s) Oral at bedtime PRN Insomnia      Allergies    No Known Drug Allergies  shellfish (Unknown)    Intolerances        REVIEW OF SYSTEMS:  +r hip pain   no CP SOB abdominal pain n/v    Vital Signs Last 24 Hrs  T(C): 36.7 (30 Apr 2025 04:44), Max: 37.4 (29 Apr 2025 16:05)  T(F): 98.1 (30 Apr 2025 04:44), Max: 99.3 (29 Apr 2025 16:05)  HR: 75 (30 Apr 2025 04:44) (61 - 80)  BP: 138/78 (30 Apr 2025 04:44) (112/60 - 138/78)  BP(mean): --  RR: 18 (30 Apr 2025 04:44) (18 - 18)  SpO2: 98% (30 Apr 2025 04:44) (98% - 100%)    Parameters below as of 29 Apr 2025 11:00  Patient On (Oxygen Delivery Method): nasal cannula        PHYSICAL EXAM:  GENERAL: NAD, well-groomed, well-developed  HEAD:  Atraumatic, Normocephalic  EYES: EOMI, sclera non-icteric  ENMT:  Moist mucous membranes  NERVOUS SYSTEM:  Alert & Oriented to name location situation  CHEST/LUNG: Clear to percussion bilaterally  HEART: Regular rate and rhythm;   ABDOMEN: Soft, Nontender, Nondistended  EXTREMITIES:  no le edema   SKIN: warm dry      LABS:                        10.7   6.11  )-----------( 232      ( 29 Apr 2025 07:20 )             33.9     04-29    144  |  111[H]  |  13  ----------------------------<  87  3.8   |  27  |  0.74    Ca    8.7      29 Apr 2025 07:20  Phos  3.5     04-29  Mg     2.1     04-29    TPro  6.5  /  Alb  2.9[L]  /  TBili  0.4  /  DBili  x   /  AST  24  /  ALT  27  /  AlkPhos  108  04-29      Urinalysis Basic - ( 29 Apr 2025 07:20 )    Color: x / Appearance: x / SG: x / pH: x  Gluc: 87 mg/dL / Ketone: x  / Bili: x / Urobili: x   Blood: x / Protein: x / Nitrite: x   Leuk Esterase: x / RBC: x / WBC x   Sq Epi: x / Non Sq Epi: x / Bacteria: x      CAPILLARY BLOOD GLUCOSE          RADIOLOGY & ADDITIONAL TESTS:    Imaging Personally Reviewed:  [ X] YES  [ ] NO    Consultant(s) Notes Reviewed:  [ X] YES  [ ] NO    Care Discussed with Consultants/Other Providers [X ] YES  [ ] NO

## 2025-04-30 NOTE — PROGRESS NOTE ADULT - ASSESSMENT
69 years old female with no significant past medical history present to ED with syncope. Patient has h/o chronic hip pain. Her left hip pain was worsened than baseline today AM. While standing, patient felt dizzy then she sat down on her bed. Reported worsening of dizziness associated with blurry vision and passed out for around 30 seconds. No chest pain, SOB or diaphoresis. No nausea, vomiting or diarrhea.   Hemodynamically stable, afebrile, sat well at RA. EKG with NSR, nonspecific ST changes. No leukocytosis, plt 214, Cr 0.89, TSH 1.090, hsTnT negative. CXR with no focal consolidation. Xray hip with mild OA. CT head with no acute pathology.     < from: Xray Hip 2-3 Views, Right (04.28.25 @ 11:10) >  Mild OA with no radiographic evidence of calcific tendinosis. If there is continued clinical concern follow-up MRI can be ordered.  Negative chest  < end of copied text >    # R hip pain - with mild OA.  Still with significant pain.  Will check CT hip to r/o fracture.  Trial of Motrin.  # Syncope - CT head no acute pathology, Some PVCs on Tele.  Outpatient Cardiology followup.  # Impaired ambulation.  ·  Plan: due to right hip pain/OA Xray hip with mild OA PT consult.  # Inpatient DVT Prophylaxis - Lovenox subcut    Anticipate DIDI pending facility and insurance auth.        69 years old female with no significant past medical history present to ED with syncope. Patient has h/o chronic hip pain. Her left hip pain was worsened than baseline today AM. While standing, patient felt dizzy then she sat down on her bed. Reported worsening of dizziness associated with blurry vision and passed out for around 30 seconds. No chest pain, SOB or diaphoresis. No nausea, vomiting or diarrhea.   Hemodynamically stable, afebrile, sat well at RA. EKG with NSR, nonspecific ST changes. No leukocytosis, plt 214, Cr 0.89, TSH 1.090, hsTnT negative. CXR with no focal consolidation. Xray hip with mild OA. CT head with no acute pathology.     < from: Xray Hip 2-3 Views, Right (04.28.25 @ 11:10) >  Mild OA with no radiographic evidence of calcific tendinosis. If there is continued clinical concern follow-up MRI can be ordered.  Negative chest  < end of copied text >    # R hip pain  #R hip OA  #R hip rupture calcific tendinosis    with mild OA.  Still with significant pain.  CT hip notable for  Focus of likely acutely ruptured calcific tendinosis with extensive surrounding soft tissue edema, inflammation, and deep fluid tracking at   least the level of the mid thigh  -ortho consulted 4/30-->outpatient follow up   # Syncope  CT head no acute pathology, Some PVCs on Tele.  Outpatient Cardiology followup.  -I reviewed tele 4/30 with PVCs--no afib/aflutter noted   # Impaired ambulation.  Plan: due to right hip pain/OA Xray hip with mild OA PT consult.-->recommending DIDI    # Inpatient DVT Prophylaxis - Lovenox subcut    Anticipate DIDI pending facility and insurance auth.

## 2025-04-30 NOTE — CONSULT NOTE ADULT - SUBJECTIVE AND OBJECTIVE BOX
69y Female presents with Right hip pain that began about one week ago after a vigorous workup including hip ABduction exercises. Pain worsened over the past week and it became difficult for the patient to walk. Denies numbness/tingling in the affected extremity. Denies head strike/LOC/other orthopedic injuries at this time. Patient ambulates without assistance at baseline.    PAST MEDICAL & SURGICAL HISTORY:  No pertinent past medical history    Home Medications:  Combigan 0.2%-0.5% ophthalmic solution: in each affected eye (28 Apr 2025 14:15)  latanoprost 0.005% ophthalmic solution: 1 drop(s) in each eye once a day (at bedtime) (28 Apr 2025 14:15)    Allergies    No Known Drug Allergies  shellfish (Unknown)    Intolerances               10.7   6.11  )-----------( 232      ( 29 Apr 2025 07:20 )             33.9     04-29    144  |  111[H]  |  13  ----------------------------<  87  3.8   |  27  |  0.74    Ca    8.7      29 Apr 2025 07:20  Phos  3.5     04-29  Mg     2.1     04-29    TPro  6.5  /  Alb  2.9[L]  /  TBili  0.4  /  DBili  x   /  AST  24  /  ALT  27  /  AlkPhos  108  04-29    Urinalysis Basic - ( 29 Apr 2025 07:20 )  Color: x / Appearance: x / SG: x / pH: x  Gluc: 87 mg/dL / Ketone: x  / Bili: x / Urobili: x   Blood: x / Protein: x / Nitrite: x   Leuk Esterase: x / RBC: x / WBC x   Sq Epi: x / Non Sq Epi: x / Bacteria: x    Vital Signs Last 24 Hrs  T(C): 36.6 (30 Apr 2025 11:24), Max: 37.4 (29 Apr 2025 16:05)  T(F): 97.8 (30 Apr 2025 11:24), Max: 99.3 (29 Apr 2025 16:05)  HR: 77 (30 Apr 2025 07:00) (75 - 80)  BP: 138/78 (30 Apr 2025 04:44) (112/60 - 138/78)  BP(mean): --  RR: 18 (30 Apr 2025 04:44) (18 - 18)  SpO2: 98% (30 Apr 2025 04:44) (98% - 100%)    PHYSICAL EXAM  General: NAD, Awake and Alert    RIGHT Lower Extremity:   Skin intact, no erythema, ecchymosis, edema, or gross deformity   TTP over greater troch area/lateral hip  NTTP over the bony prominences of the knee/ankle/foot/toes  Painless passive/active ROM of the hip/knee/ankle/foot  Compartments soft and compressible  Calf nontender  No pain with log roll, No pain on axial loading  Motor: +TA/EHL/FHL/GSC  Sensory: +SPN/DPN/SAPH/DENISE/TIB  + DP/PT pulses    Secondary Assessment:  NC/AT, NTTP of clavicles, NTTP of C-,T-,L-Spine, NTTP of Pelvis  RUE: NTTP of Shoulder, Elbow, Wrist, Hand; NT with AROM/PROM of Shoulder, Elbow, Wrist, Hand; AIN/PIN/Med/Uln/Msc/Rad/Ax intact  LUE: NTTP of Shoulder, Elbow, Wrist, Hand; NT with AROM/PROM of Shoulder, Elbow, Wrist, Hand; AIN/PIN/Med/Uln/Msc/Rad/Ax intact  LLE: Able to SLR, NT with Log Roll, NT with Heel Strike, NTTP of Hip, Knee, Ankle, Foot; NT with AROM/PROM of Hip, Knee, Ankle, Foot; Q/H/Gsc/TA/EHL/FHL intact    IMAGING:  XR : pending  CT R Hip: ACC: [id number] EXAM:  CT HIP ONLY RT   ORDERED BY: TERRENCE GREENE     PROCEDURE DATE:  04/30/2025          INTERPRETATION:  EXAMINATION:CT HIP RIGHT    CLINICAL INDICATION:Pain. Evaluate for fracture.    COMPARISON: Right hip radiographs dated 4/20/2025.    TECHNIQUE: CT of the right hip was performed without intravenous contrast.    INTERPRETATION:    : No additional findings.    Lower lumbar spine: There is transitional lumbosacral anatomy. Lower   lumbar spine is partially imaged. There is no acute fracture identified.    Pelvis/hips: There is a 14 mm spiculated dense (1142 Hounsfield units)   lesion within the right S1 segment of the sacrum consistent with a bone   island. There is no acute fracture identified. The hip joint spaces are  maintained. The alignment is normal.    Soft tissues: There is ill-defined amorphous fragmented density abutting   the greater trochanter in association with the gluteus medius tendon   insertion, measuring 2 cm anteroposteriorly and 6 mm transversely and 1.6   cm craniocaudally. There is marked surrounding deep fluid tracking deep   to the iliotibial tract and extending intramuscularly into the anterior   thigh, to the level of the mid thigh and extending beyond the imaged   field of view.    IMPRESSION:  1.  Focus of likely acutely ruptured calcific tendinosis with extensive   surrounding soft tissue edema, inflammation, and deep fluid tracking at   least the level of the mid thigh, incompletely imaged distally.    2.  No acute fracture or dislocation.    --- End of Report ---    SHLOMIT GOLDBERG-STEIN MD  This document has been electronically signed. Apr 30 2025 12:30PM      Assessment/Plan:  69y Female with a R Greater trochanter avulsion fracture    -Pain control as needed  -DVT ppx  -WBAT with ABduction precautions  -PTOT  -Ortho stable for DC  -Follow up with  ??? in the office in 5-7 days. Call office for appointment.    Discussed with  ???, who agrees with above plan   69y Female presents with Right hip pain that began about one week ago after a vigorous workup including hip ABduction exercises. Pain worsened over the past week and it became difficult for the patient to walk. Denies numbness/tingling in the affected extremity. Denies head strike/LOC/other orthopedic injuries at this time. Patient ambulates without assistance at baseline.    PAST MEDICAL & SURGICAL HISTORY:  No pertinent past medical history    Home Medications:  Combigan 0.2%-0.5% ophthalmic solution: in each affected eye (28 Apr 2025 14:15)  latanoprost 0.005% ophthalmic solution: 1 drop(s) in each eye once a day (at bedtime) (28 Apr 2025 14:15)    Allergies    No Known Drug Allergies  shellfish (Unknown)    Intolerances               10.7   6.11  )-----------( 232      ( 29 Apr 2025 07:20 )             33.9     04-29    144  |  111[H]  |  13  ----------------------------<  87  3.8   |  27  |  0.74    Ca    8.7      29 Apr 2025 07:20  Phos  3.5     04-29  Mg     2.1     04-29    TPro  6.5  /  Alb  2.9[L]  /  TBili  0.4  /  DBili  x   /  AST  24  /  ALT  27  /  AlkPhos  108  04-29    Urinalysis Basic - ( 29 Apr 2025 07:20 )  Color: x / Appearance: x / SG: x / pH: x  Gluc: 87 mg/dL / Ketone: x  / Bili: x / Urobili: x   Blood: x / Protein: x / Nitrite: x   Leuk Esterase: x / RBC: x / WBC x   Sq Epi: x / Non Sq Epi: x / Bacteria: x    Vital Signs Last 24 Hrs  T(C): 36.6 (30 Apr 2025 11:24), Max: 37.4 (29 Apr 2025 16:05)  T(F): 97.8 (30 Apr 2025 11:24), Max: 99.3 (29 Apr 2025 16:05)  HR: 77 (30 Apr 2025 07:00) (75 - 80)  BP: 138/78 (30 Apr 2025 04:44) (112/60 - 138/78)  BP(mean): --  RR: 18 (30 Apr 2025 04:44) (18 - 18)  SpO2: 98% (30 Apr 2025 04:44) (98% - 100%)    PHYSICAL EXAM  General: NAD, Awake and Alert    RIGHT Lower Extremity:   Skin intact, no erythema, ecchymosis, edema, or gross deformity   TTP over greater troch area/lateral hip  NTTP over the bony prominences of the knee/ankle/foot/toes  Painless passive/active ROM of the hip/knee/ankle/foot  Compartments soft and compressible  Calf nontender  No pain with log roll, No pain on axial loading  Motor: +TA/EHL/FHL/GSC  Sensory: +SPN/DPN/SAPH/DENISE/TIB  + DP/PT pulses    Secondary Assessment:  NC/AT, NTTP of clavicles, NTTP of C-,T-,L-Spine, NTTP of Pelvis  RUE: NTTP of Shoulder, Elbow, Wrist, Hand; NT with AROM/PROM of Shoulder, Elbow, Wrist, Hand; AIN/PIN/Med/Uln/Msc/Rad/Ax intact  LUE: NTTP of Shoulder, Elbow, Wrist, Hand; NT with AROM/PROM of Shoulder, Elbow, Wrist, Hand; AIN/PIN/Med/Uln/Msc/Rad/Ax intact  LLE: Able to SLR, NT with Log Roll, NT with Heel Strike, NTTP of Hip, Knee, Ankle, Foot; NT with AROM/PROM of Hip, Knee, Ankle, Foot; Q/H/Gsc/TA/EHL/FHL intact    IMAGING:  XR : pending  CT R Hip: ACC: [id number] EXAM:  CT HIP ONLY RT   ORDERED BY: TERRENCE GREENE     PROCEDURE DATE:  04/30/2025          INTERPRETATION:  EXAMINATION:CT HIP RIGHT    CLINICAL INDICATION:Pain. Evaluate for fracture.    COMPARISON: Right hip radiographs dated 4/20/2025.    TECHNIQUE: CT of the right hip was performed without intravenous contrast.    INTERPRETATION:    : No additional findings.    Lower lumbar spine: There is transitional lumbosacral anatomy. Lower   lumbar spine is partially imaged. There is no acute fracture identified.    Pelvis/hips: There is a 14 mm spiculated dense (1142 Hounsfield units)   lesion within the right S1 segment of the sacrum consistent with a bone   island. There is no acute fracture identified. The hip joint spaces are  maintained. The alignment is normal.    Soft tissues: There is ill-defined amorphous fragmented density abutting   the greater trochanter in association with the gluteus medius tendon   insertion, measuring 2 cm anteroposteriorly and 6 mm transversely and 1.6   cm craniocaudally. There is marked surrounding deep fluid tracking deep   to the iliotibial tract and extending intramuscularly into the anterior   thigh, to the level of the mid thigh and extending beyond the imaged   field of view.    IMPRESSION:  1.  Focus of likely acutely ruptured calcific tendinosis with extensive   surrounding soft tissue edema, inflammation, and deep fluid tracking at   least the level of the mid thigh, incompletely imaged distally.    2.  No acute fracture or dislocation.    --- End of Report ---    SHLOMIT GOLDBERG-STEIN MD  This document has been electronically signed. Apr 30 2025 12:30PM      Assessment/Plan:  69y Female with a R glut medius tendon rupture avulsion fracture  Followup MRI R hip to rule out fracture extension into hip IT region    -NWB RLE until official MRI read  -Pain control as needed  -DVT ppx  -PTOT  Discussed with Dr. Velazquez, who agrees with above plan

## 2025-05-01 ENCOUNTER — TRANSCRIPTION ENCOUNTER (OUTPATIENT)
Age: 70
End: 2025-05-01

## 2025-05-01 VITALS
DIASTOLIC BLOOD PRESSURE: 78 MMHG | SYSTOLIC BLOOD PRESSURE: 124 MMHG | RESPIRATION RATE: 18 BRPM | HEART RATE: 72 BPM | OXYGEN SATURATION: 98 % | TEMPERATURE: 98 F

## 2025-05-01 PROCEDURE — 99239 HOSP IP/OBS DSCHRG MGMT >30: CPT

## 2025-05-01 PROCEDURE — 72195 MRI PELVIS W/O DYE: CPT | Mod: 26

## 2025-05-01 RX ORDER — IBUPROFEN 200 MG
1 TABLET ORAL
Qty: 14 | Refills: 0
Start: 2025-05-01

## 2025-05-01 RX ADMIN — Medication 400 MILLIGRAM(S): at 13:17

## 2025-05-01 RX ADMIN — Medication 400 MILLIGRAM(S): at 14:10

## 2025-05-01 RX ADMIN — TIMOLOL MALEATE 1 DROP(S): 6.8 SOLUTION OPHTHALMIC at 06:09

## 2025-05-01 RX ADMIN — BRIMONIDINE TARTRATE 1 DROP(S): 1.5 SOLUTION/ DROPS OPHTHALMIC at 06:09

## 2025-05-01 RX ADMIN — Medication 400 MILLIGRAM(S): at 07:30

## 2025-05-01 RX ADMIN — Medication 400 MILLIGRAM(S): at 06:09

## 2025-05-01 NOTE — DISCHARGE NOTE NURSING/CASE MANAGEMENT/SOCIAL WORK - FINANCIAL ASSISTANCE
[facility] provides services at a reduced cost to those who are determined to be eligible through [facility]’s financial assistance program. Information regarding [facility]’s financial assistance program can be found by going to [email or web address] or by calling [phone].

## 2025-05-01 NOTE — CHART NOTE - NSCHARTNOTEFT_GEN_A_CORE
MRI of right hip reviewed with Dr. Velazquez. No evidence of fracture on MRI. Patient can be WBAT of RLE and can FU outpatient with Dr. Velazquez upon discharge. Discussed with Dr. Velazquez who agrees with plan.

## 2025-05-01 NOTE — DISCHARGE NOTE PROVIDER - NSDCFUADDAPPT_GEN_ALL_CORE_FT
APPTS ARE READY TO BE MADE: [X] YES    Best Family or Patient Contact (if needed):    Additional Information about above appointments (if needed):    1: PCP   2: ortho   3: cards    Other comments or requests:

## 2025-05-01 NOTE — DISCHARGE NOTE PROVIDER - CARE PROVIDER_API CALL
Salud Velazquez  Orthopaedic Surgery  [address]  [place] [address]  Phone: [phone]  Fax: [phone]  Follow Up Time: 2 weeks    Your PCP,   Phone: (   )    -  Fax: (   )    -  Follow Up Time: 1 week    cardiology,   Phone: (   )    -  Fax: (   )    -  Follow Up Time: Routine

## 2025-05-01 NOTE — DISCHARGE NOTE PROVIDER - HOSPITAL COURSE
HPI:  69 years old female with no significant past medical history present to ED with syncope. Patient has h/o chronic hip pain. Her left hip pain was worsened than baseline today AM. While standing, patient felt dizzy then she sat down on her bed. Reported worsening of dizziness associated with blurry vision and passed out for around 30 seconds. No chest pain, SOB or diaphoresis. No nausea, vomiting or diarrhea.   Hemodynamically stable, afebrile, sat well at RA. EKG with NSR, nonspecific ST changes. No leukocytosis, plt 214, Cr 0.89, TSH 1.090, hsTnT negative. CXR with no focal consolidation. Xray hip with mild OA. CT head with no acute pathology.     Hospital course:  Patient admitted for syncopal episode. CTH without acute findings. Orthostatic vitals negative. Syncope likely vasovagal due to R hip pain. CT hip this admission notable for acutely ruptured calcific tendinosis. Ortho was consulted, recommended MRI. MRI notable for non full thickness tendon tear but no AVN or fracture. Ortho reviewed MRI, recommended outpatient follow up no weight bearing restrictions. PT recommended DIDI, patient preferred home PT. Patient discharged home with home PT.     # R hip pain  #R hip OA  #R hip rupture calcific tendinosis    with mild OA.  Still with significant pain.  CT hip notable for  Focus of likely acutely ruptured calcific tendinosis with extensive surrounding soft tissue edema, inflammation, and deep fluid tracking at   least the level of the mid thigh  ortho consulted recommended MRI to r/o avulsion fracture. MRI negative for fracture or dislocation.   Can follow up with Dr Velazquez in clinic   Will discharge with short course of Ibuprofen but advised the patient to follow up with PCP for further pain management. Also advised patient to look for signs and symptoms of bleeding. Patient amenable to plan.     # Syncope  CT head no acute pathology, Some PVCs on Tele.  Outpatient Cardiology followup.  -I reviewed tele 4/30 with PVCs--no afib/aflutter noted   Syncope likely vasovagal as patient was endorsing R hip pain that preceded syncopal episode. Orthostatic vitals negative. Tele without afib, aflutter, or ventricular arrhythmia. Some PVCs noted, patient denies symptoms. Can follow up with cardiology as an outpatient.     # Impaired ambulation.  Plan: due to right hip pain/OA Xray hip with mild OA PT consult.-->recommending DIDI. Patient prefers home PT. CM arranged prior to discharge.

## 2025-05-01 NOTE — CHART NOTE - NSCHARTNOTEFT_GEN_A_CORE
[facility]  [address], [address]  [phone]      Name: GUILLERMO PATRICK (1955)  Date: 4/28/25- 5/1/25        This is to certify that the above named patient was evaluated and treated in the [facility] on the above dates.    Recommendations:    [  ] May return to work/ all activities as tolerated without limitations    [X] Recommend rest, may return to work/ all activities as tolerated without limitations on 5/5/25    [  ] Further evaluation and/ or follow-up necessary.            Sincerely,        JONY Lockwood Amanda  05-01-25 @ 17:11 [facility]  [address], [address]  [phone]      Name: GUILLERMO PATRICK (1955)  Date: 4/28/25- 5/1/25        This is to certify that the above named patient was evaluated and treated in the [facility] [place] on the above dates.    Recommendations:    [  ] May return to work/ all activities as tolerated without limitations    [X] Recommend rest, may return to work/ all activities as tolerated without limitations on 5/12/25    [  ] Further evaluation and/ or follow-up necessary.            Sincerely,        JONY Lockwood Amanda  05-01-25 @ 17:11

## 2025-05-01 NOTE — DISCHARGE NOTE PROVIDER - NSDCCPCAREPLAN_GEN_ALL_CORE_FT
PRINCIPAL DISCHARGE DIAGNOSIS  Diagnosis: Syncope  Assessment and Plan of Treatment: Vasovagal syncope is a temporary loss of consciousness caused by a sudden drop in your blood pressure and heart rate. The "vaso" part refers to the blood vessels, and "vagal" refers to the vagus nerve which helps control heart rate and blood vessel dilation.  In your case, the severe acute pain you were experiencing triggered a vasovagal episode which made you pass out or "syncope."  Here's what can happen - when you experience intense pain, it can overstimulate the vagus nerve. This nerve miscommunication causes your blood vessels to dilate too much and your heart to slow down excessively.  With the blood vessels dilated and the heart not pumping effectively, there is a dramatic drop in your blood pressure. This significantly reduces blood flow to the brain for a brief period, causing the loss of consciousness or fainting spell.  The period of unconsciousness during vasovagal syncope is usually very brief, often just lasting seconds to a couple minutes. As blood flow returns to the brain, you'll gradually regain consciousness.   It'll be important to control your pain to prevent another episode of vasovagal syncope. Please take Ibuprofen as prescribed and follow up with your PCP for further management.      SECONDARY DISCHARGE DIAGNOSES  Diagnosis: Pain in right hip  Assessment and Plan of Treatment: You came to the hospital with right hip pain. You had an MRI and CT scan that showed you have a partial tear in a tendon of your hip. Your imaging did not show any signs of a fracture or dislocation of the hip. The orthopedic doctor saw you and recommended follow up in clinic. Home physical therapy was arranged prior to discharge.    Diagnosis: Asymptomatic PVCs  Assessment and Plan of Treatment: The heart monitoring tests we did showed that you are having some extra or skipped heartbeats called premature ventricular contractions, or PVCs for short. PVCs are very common and not necessarily something to be worried about, especially if you don't have any symptoms from them. Please follow up with your PCP or a cardiologist in clinic as you might benefit from a longer heart monitor to see how frequently you are experiencing PVCs.

## 2025-05-01 NOTE — PROGRESS NOTE ADULT - SUBJECTIVE AND OBJECTIVE BOX
Patient seen and examined at bedside. Pain well controlled with medication. Patient denies any numbness, tingling, weakness, or any other orthopaedic complaint. Denies N/V/CP/SOB.    LABS:                        10.7   6.11  )-----------( 232      ( 29 Apr 2025 07:20 )             33.9     04-29    144  |  111[H]  |  13  ----------------------------<  87  3.8   |  27  |  0.74    Ca    8.7      29 Apr 2025 07:20  Phos  3.5     04-29  Mg     2.1     04-29    TPro  6.5  /  Alb  2.9[L]  /  TBili  0.4  /  DBili  x   /  AST  24  /  ALT  27  /  AlkPhos  108  04-29    Urinalysis Basic - ( 29 Apr 2025 07:20 )  Color: x / Appearance: x / SG: x / pH: x  Gluc: 87 mg/dL / Ketone: x  / Bili: x / Urobili: x   Blood: x / Protein: x / Nitrite: x   Leuk Esterase: x / RBC: x / WBC x   Sq Epi: x / Non Sq Epi: x / Bacteria: x    VITAL SIGNS:  T(C): 36.8 (05-01-25 @ 04:49), Max: 37.4 (04-30-25 @ 23:00)  HR: 68 (05-01-25 @ 04:49) (68 - 91)  BP: 115/76 (05-01-25 @ 04:49) (112/69 - 121/74)  RR: 18 (05-01-25 @ 04:49) (18 - 18)  SpO2: 98% (05-01-25 @ 04:49) (97% - 98%)    PHYSICAL EXAM  General: NAD, Awake and Alert    RIGHT Lower Extremity:   Skin intact, no erythema, ecchymosis, edema, or gross deformity   TTP over greater troch area/lateral hip  NTTP over the bony prominences of the knee/ankle/foot/toes  Painless passive/active ROM of the hip/knee/ankle/foot  Compartments soft and compressible  Calf nontender  No pain with log roll, No pain on axial loading  Motor: +TA/EHL/FHL/GSC  Sensory: +SPN/DPN/SAPH/DENISE/TIB  + DP/PT pulses    Assessment/Plan:  69y Female with a R glut medius tendon rupture avulsion fracture  Followup MRI R hip to rule out fracture extension into hip IT region    -NWB RLE until official MRI read  -Pain control as needed  -DVT ppx  -PTOT  Discussed with Dr. Velazquez, who agrees with above plan

## 2025-05-01 NOTE — DISCHARGE NOTE PROVIDER - PROVIDER TOKENS
PROVIDER:[TOKEN:[[id number]],FOLLOWUP:[2 weeks]],FREE:[LAST:[Your PCP],PHONE:[(   )    -],FAX:[(   )    -],FOLLOWUP:[1 week]],FREE:[LAST:[cardiology],PHONE:[(   )    -],FAX:[(   )    -],FOLLOWUP:[Routine]]

## 2025-05-01 NOTE — DISCHARGE NOTE PROVIDER - NSDCMRMEDTOKEN_GEN_ALL_CORE_FT
Combigan 0.2%-0.5% ophthalmic solution: in each affected eye  ibuprofen 400 mg oral tablet: 1 tab(s) orally 3 times a day as needed for  severe pain take 1-2 tablets as needed for severe pain. Do not exceed more than 3 times a day.  latanoprost 0.005% ophthalmic solution: 1 drop(s) in each eye once a day (at bedtime)

## 2025-05-01 NOTE — DISCHARGE NOTE PROVIDER - ATTENDING DISCHARGE PHYSICAL EXAMINATION:
Vital Signs Last 24 Hrs  T(C): 36.7 (01 May 2025 11:08), Max: 37.4 (30 Apr 2025 23:00)  T(F): 98 (01 May 2025 11:08), Max: 99.4 (30 Apr 2025 23:00)  HR: 77 (01 May 2025 11:08) (68 - 91)  BP: 113/70 (01 May 2025 11:08) (112/69 - 121/74)  BP(mean): --  RR: 18 (01 May 2025 11:08) (18 - 18)  SpO2: 98% (01 May 2025 11:08) (97% - 98%)    Parameters below as of 01 May 2025 11:08  Patient On (Oxygen Delivery Method): room air    GENERAL: NAD, well-groomed, well-developed  HEAD:  Atraumatic, Normocephalic  EYES: EOMI, sclera non-icteric  ENMT:  Moist mucous membranes  NERVOUS SYSTEM:  Alert & Oriented to name location situation  CHEST/LUNG: Clear to percussion bilaterally  HEART: Regular rate and rhythm;   ABDOMEN: Soft, Nontender, Nondistended  EXTREMITIES:  no le edema   SKIN: warm dry

## 2025-05-01 NOTE — DISCHARGE NOTE NURSING/CASE MANAGEMENT/SOCIAL WORK - PATIENT PORTAL LINK FT
You can access the FollowMyHealth Patient Portal offered by [facility] by registering at the following website: [email or web address]. By joining [facility]’s FollowMyHealth portal, you will also be able to view your health information using other applications (apps) compatible with our system.

## 2025-05-01 NOTE — DISCHARGE NOTE NURSING/CASE MANAGEMENT/SOCIAL WORK - NSDCPEFALRISK_GEN_ALL_CORE
For information on Fall & Injury Prevention, visit: [email or web address] OR  [email or web address] OR  https://www.cdc.gov/steadi/patient.html

## 2025-05-02 PROBLEM — Z00.00 ENCOUNTER FOR PREVENTIVE HEALTH EXAMINATION: Status: ACTIVE | Noted: 2025-05-02

## 2025-05-08 ENCOUNTER — APPOINTMENT (OUTPATIENT)
Dept: ORTHOPEDIC SURGERY | Facility: CLINIC | Age: 70
End: 2025-05-08
Payer: MEDICARE

## 2025-05-08 DIAGNOSIS — M70.61 TROCHANTERIC BURSITIS, RIGHT HIP: ICD-10-CM

## 2025-05-08 PROCEDURE — 99204 OFFICE O/P NEW MOD 45 MIN: CPT

## 2025-05-08 RX ORDER — DICLOFENAC POTASSIUM 50 MG/1
50 TABLET, COATED ORAL 3 TIMES DAILY
Qty: 60 | Refills: 1 | Status: ACTIVE | COMMUNITY
Start: 2025-05-08 | End: 1900-01-01

## 2025-05-08 RX ORDER — NICOTINE POLACRILEX 2 MG
GUM BUCCAL
Refills: 0 | Status: ACTIVE | COMMUNITY

## 2025-05-13 DIAGNOSIS — R55 SYNCOPE AND COLLAPSE: ICD-10-CM

## 2025-05-13 DIAGNOSIS — M16.11 UNILATERAL PRIMARY OSTEOARTHRITIS, RIGHT HIP: ICD-10-CM

## 2025-05-13 DIAGNOSIS — M62.18: ICD-10-CM

## 2025-05-13 DIAGNOSIS — I49.3 VENTRICULAR PREMATURE DEPOLARIZATION: ICD-10-CM

## 2025-05-19 ENCOUNTER — APPOINTMENT (OUTPATIENT)
Dept: ORTHOPEDIC SURGERY | Facility: CLINIC | Age: 70
End: 2025-05-19